# Patient Record
Sex: FEMALE
[De-identification: names, ages, dates, MRNs, and addresses within clinical notes are randomized per-mention and may not be internally consistent; named-entity substitution may affect disease eponyms.]

---

## 2017-12-09 ENCOUNTER — HOSPITAL ENCOUNTER (EMERGENCY)
Dept: HOSPITAL 41 - JD.ED | Age: 22
Discharge: HOME | End: 2017-12-09
Payer: COMMERCIAL

## 2017-12-09 VITALS — DIASTOLIC BLOOD PRESSURE: 83 MMHG | SYSTOLIC BLOOD PRESSURE: 115 MMHG

## 2017-12-09 DIAGNOSIS — N39.0: Primary | ICD-10-CM

## 2017-12-09 DIAGNOSIS — Z91.030: ICD-10-CM

## 2017-12-09 PROCEDURE — 81001 URINALYSIS AUTO W/SCOPE: CPT

## 2017-12-09 PROCEDURE — 99283 EMERGENCY DEPT VISIT LOW MDM: CPT

## 2017-12-09 PROCEDURE — 87086 URINE CULTURE/COLONY COUNT: CPT

## 2017-12-09 PROCEDURE — 87186 SC STD MICRODIL/AGAR DIL: CPT

## 2017-12-09 NOTE — EDM.PDOC
ED HPI GENERAL MEDICAL PROBLEM





- General


Chief Complaint: Genitourinary Problem


Stated Complaint: POSS BLADDER INFECTION


Time Seen by Provider: 12/09/17 19:05


Source of Information: Reports: Patient, Family (Mother), RN Notes Reviewed


History Limitations: Reports: No Limitations





- History of Present Illness


INITIAL COMMENTS - FREE TEXT/NARRATIVE: 





The patient states that she developed dysuria about 3-4 days ago, lower central 

back pain last night, then urinary frequency and urgency today. No recent fever

, nausea, or vomiting. The patient states that she has had similar symptoms in 

the past, when she had a UTI.





The patient states that she had a vaginal delivery on 11/13/2017, but has not 

been sexually active since. The patient is not breast-feeding.





The patient took some ibuprofen today, but otherwise has not tried any home 

remedies or treatments.





The patient's PCP is Keri Ta, who has not been made aware of this situation.








  ** Left Lower Back


Pain Score (Numeric/FACES): 5





- Related Data


 Allergies











Allergy/AdvReac Type Severity Reaction Status Date / Time


 


venom-honey bee Allergy  Swelling Verified 12/09/17 19:03





[bee venom (honey bee)]     











Home Meds: 


 Home Meds





Phenazopyridine HCl [Pyridium] 1 tab PO Q8H PRN #5 tablet 12/09/17 [Rx]


Sulfamethoxazole/Trimethoprim [Bactrim Ds Tablet] 1 tab PO Q12H #9 tablet 12/09/ 17 [Rx]











Past Medical History


HEENT History: Reports: Impaired Vision


Other HEENT History: Wears glasses.





- Past Surgical History


HEENT Surgical History: Reports: Naso-Sinus Surgery (Rhinoplasty), Oral Surgery 

([place] teeth extraction)





Social & Family History





- Family History


Family Medical History: Noncontributory





- Tobacco Use


Smoking Status *Q: Never Smoker


Second Hand Smoke Exposure: No





- Caffeine Use


Caffeine Use: Reports: Soda





- Alcohol Use


Alcohol Use History: Yes


Alcohol Use Frequency: Socially





- Recreational Drug Use


Recreational Drug Use: No





- Living Situation & Occupation


Living situation: Reports: , with Spouse, with Family (2 kids)


Occupation: Employed (Teacher in [place])





ED ROS GENERAL





- Review of Systems


Review Of Systems: See Below


Constitutional: Reports: No Symptoms


HEENT: Reports: No Symptoms


Respiratory: Reports: No Symptoms


Cardiovascular: Reports: No Symptoms


Endocrine: Reports: No Symptoms


GI/Abdominal: Reports: No Symptoms


: Reports: No Symptoms


Musculoskeletal: Reports: No Symptoms


Skin: Reports: No Symptoms


Neurological: Reports: No Symptoms


Psychiatric: Reports: No Symptoms


Hematologic/Lymphatic: Reports: No Symptoms


Immunologic: Reports: No Symptoms





ED EXAM, RENAL/





- Physical Exam


Exam: See Below


Exam Limited By: No Limitations


General Appearance: Alert, WD/WN, No Apparent Distress


Ears: Normal External Exam, Hearing Grossly Normal


Nose: Normal Inspection, No Blood


Throat/Mouth: Normal Inspection, Normal Lips, Normal Voice, No Airway Compromise


Head: Atraumatic, Normocephalic


Neck: Normal Inspection, Full Range of Motion


Respiratory/Chest: No Respiratory Distress, Lungs Clear, Normal Breath Sounds, 

No Accessory Muscle Use


Cardiovascular: Normal Peripheral Pulses, Regular Rate, Rhythm, No Gallop, No 

JVD, No Murmur, No Rub


GI/Abdominal: Normal Bowel Sounds, Soft, Non-Tender (even suprapubically), No 

Organomegaly, No Distention, No Abnormal Bruit, No Mass


 (Female) Exam: Deferred


Rectal (Female) Exam: Deferred


Back Exam: Normal Inspection, Full Range of Motion.  No: CVA Tenderness (L), 

CVA Tenderness (R)


Extremities: Normal Inspection, Normal Range of Motion, No Pedal Edema, Normal 

Capillary Refill


Neurological: Alert, Oriented, Normal Cognition, No Motor/Sensory Deficits


Psychiatric: Normal Affect


Skin Exam: Warm, Dry, Intact, Normal Color, No Rash





Course





- Vital Signs


Last Recorded V/S: 


 Last Vital Signs











Temp  36.2 C   12/09/17 19:04


 


Pulse  76   12/09/17 19:04


 


Resp  18   12/09/17 19:04


 


BP  115/83   12/09/17 19:04


 


Pulse Ox  100   12/09/17 19:04














- Orders/Labs/Meds


Orders: 


 Active Orders 24 hr











 Category Date Time Status


 


 CULTURE URINE [RM] Stat Lab  12/09/17 21:18 Uncollected


 


 Phenazopyridine [Urinary Pain Relief] Med  12/09/17 21:23 Stat





 95 mg PO ONETIME STA   











Labs: 


 Laboratory Tests











  12/09/17 Range/Units





  19:30 


 


Urine Color  Yellow  (Yellow)  


 


Urine Appearance  Slt cloudy H  (Clear)  


 


Urine pH  7.0  (5.0-8.0)  


 


Ur Specific Gravity  1.020  (1.005-1.030)  


 


Urine Protein  1+ H  (Negative)  


 


Urine Glucose (UA)  Negative  (Negative)  


 


Urine Ketones  Negative  (Negative)  


 


Urine Occult Blood  2+ H  (Negative)  


 


Urine Nitrite  Negative  (Negative)  


 


Urine Bilirubin  Negative  (Negative)  


 


Urine Urobilinogen  0.2  (0.2-1.0)  


 


Ur Leukocyte Esterase  2+ H  (Negative)  


 


Urine RBC  5-10 H  (0-5)  /hpf


 


Urine WBC  30-40 H  (0-5)  /hpf


 


Ur Epithelial Cells  0-5  (0-5)  /hpf


 


Urine Bacteria  Moderate H  (FEW)  /hpf


 


Urine Mucus  Few  (FEW)  /hpf











Meds: 


Medications














Discontinued Medications














Generic Name Dose Route Start Last Admin





  Trade Name Freq  PRN Reason Stop Dose Admin


 


Trimethoprim/Sulfamethoxazole  1 tab  12/09/17 21:18  





  Septra Ds  PO  12/09/17 21:19  





  ONETIME ONE   














- Re-Assessments/Exams


Free Text/Narrative Re-Assessment/Exam: 





12/09/17 21:23


The patient's urinalysis is consistent with a urinary tract infection. I have 

ordered a urine culture, and will start the patient on empiric Bactrim, along 

with Pyridium. I have instructed her to stay adequately hydrated. I would like 

her to follow-up with her PCP in 3 days to check on the urine culture results.





Departure





- Departure


Time of Disposition: 21:24


Disposition: Home, Self-Care 01


Condition: Good


Clinical Impression: 


 UTI (urinary tract infection)








- Discharge Information


Referrals: 


Keri Ta PA [Primary Care Provider] - 


Forms:  ED Department Discharge


Additional Instructions: 


You were seen in the emergency room for burning urination, the need to urinate 

frequently, and low back pain.





Workup in the ER included a urinalysis, which confirmed that you have a urinary 

tract infection.





You have been started on the antibiotic Bactrim. Take one tablet every 12 hours

, as prescribed. Finish the entire prescription unless told otherwise by your 

PCP.





You have been started on the pain medicine Pyridium. You may take one tablet 

every 8 hours for 2 days, or one tablet every 12 hours for 3 days - your 

choice. Be aware that Pyridium will turn your urine orange - this is normal.





Stay adequately hydrated.





A sample of your urine was sent for culture. We recommend that you contact the 

office of Keri Ta on Tuesday afternoon, 12/12/2017, to check on your urine 

culture results, to make sure that you are on the right antibiotic.





If any other problems, please do not hesitate to return to the ER.





- My Orders


Last 24 Hours: 


My Active Orders





12/09/17 21:18


CULTURE URINE [RM] Stat 





12/09/17 21:23


Phenazopyridine [Urinary Pain Relief]   95 mg PO ONETIME STA 














- Assessment/Plan


Last 24 Hours: 


My Active Orders





12/09/17 21:18


CULTURE URINE [RM] Stat 





12/09/17 21:23


Phenazopyridine [Urinary Pain Relief]   95 mg PO ONETIME STA